# Patient Record
Sex: MALE | Race: WHITE | NOT HISPANIC OR LATINO | Employment: FULL TIME | URBAN - METROPOLITAN AREA
[De-identification: names, ages, dates, MRNs, and addresses within clinical notes are randomized per-mention and may not be internally consistent; named-entity substitution may affect disease eponyms.]

---

## 2017-11-09 ENCOUNTER — HOSPITAL ENCOUNTER (EMERGENCY)
Facility: HOSPITAL | Age: 42
Discharge: HOME | End: 2017-11-09
Attending: EMERGENCY MEDICINE | Admitting: EMERGENCY MEDICINE
Payer: COMMERCIAL

## 2017-11-09 VITALS
OXYGEN SATURATION: 99 % | TEMPERATURE: 98.1 F | RESPIRATION RATE: 20 BRPM | HEIGHT: 70 IN | WEIGHT: 177 LBS | DIASTOLIC BLOOD PRESSURE: 78 MMHG | HEART RATE: 78 BPM | SYSTOLIC BLOOD PRESSURE: 144 MMHG | BODY MASS INDEX: 25.34 KG/M2

## 2017-11-09 DIAGNOSIS — S54.02XA: Primary | ICD-10-CM

## 2017-11-09 PROCEDURE — 99283 EMERGENCY DEPT VISIT LOW MDM: CPT

## 2017-11-09 RX ORDER — DIPHENOXYLATE HYDROCHLORIDE AND ATROPINE SULFATE 2.5; .025 MG/1; MG/1
1 TABLET ORAL DAILY
COMMUNITY

## 2017-11-09 NOTE — DISCHARGE INSTRUCTIONS
Rest, ice, elevate arm  Take motrin 600mg every 6 hours as needed for pain  Call ortho for a follow up appointment  Peripheral Neuropathy   WHAT YOU NEED TO KNOW:   Peripheral neuropathy is a condition that affects how your nerves work  Nerves carry information from your brain to your body  The information does not transfer along your nerves correctly when you have neuropathy  When you have peripheral neuropathy, the nerves in your legs, arms, feet, or hands are affected  It also may affect your organs, such as your lungs, stomach, bladder, or genitals  This condition may go away on its own or you may always have it  DISCHARGE INSTRUCTIONS:   Medicines:   · Pain medicine: You may be given medicine to take away or decrease pain  Do not wait until the pain is severe before you take your medicine  · Antidepressants: This medicine helps to decrease or stop the symptoms of depression  It also used to help decrease pain  Take this medicine as directed  · Antiseizure medicine: This medicine is usually given to control seizures, but it also helps with nerve pain  · Take your medicine as directed  Contact your healthcare provider if you think your medicine is not helping or if you have side effects  Tell him of her if you are allergic to any medicine  Keep a list of the medicines, vitamins, and herbs you take  Include the amounts, and when and why you take them  Bring the list or the pill bottles to follow-up visits  Carry your medicine list with you in case of an emergency  Follow up with your healthcare provider or neurologist as directed:  Write down your questions so you remember to ask them during your visits  Physical therapy:  Physical and occupational therapists may help you exercise your arms, legs, and hands  They may teach you new ways to do things at home  Brace or splint:  You may need a device that supports or holds a body part still   For example, if you have carpal tunnel syndrome, you may need to wear a wrist brace  Manage your peripheral neuropathy:   · Avoid falls: Move with care and stand up slowly  Wear shoes that support your feet, and do not go barefoot  Ask about walking aids, such as a cane or walker  You may want to install railings or nonslip pads in your home, especially in the bathroom  Ask for more information on how to avoid falls  · Check your skin daily:  Sores can form where your skin makes contact with chairs, beds, or other body parts  They also can form under splints  Keep your skin clean, and check your skin daily for sores  · Exercise:  Ask about the best exercise plan for you  Physical activity may increase your balance and strength and may decrease your pain  It is best to start exercising slowly and do more as you get stronger  Contact your healthcare provider or neurologist if:   · Your pain is severe  · You cannot control your bladder  · You have trouble having sex  · You have questions or concerns about your condition or care  Return to the emergency department if:   · You fall  · You cannot walk at all  © 2017 [facility] Information is for End User's use only and may not be sold, redistributed or otherwise used for commercial purposes  All illustrations and images included in CareNotes® are the copyrighted property of [employer] A M [employer] Inc  or [employer]  The above information is an  only  It is not intended as medical advice for individual conditions or treatments  Talk to your doctor, nurse or pharmacist before following any medical regimen to see if it is safe and effective for you

## 2017-11-09 NOTE — ED PROVIDER NOTES
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, has been lifting heavy loads over head x 2 weeks and "hit forearm" one week ago  Denies any pain  Patient presents to the ED with weakness in left hand and tingling sensation  He states last week he hit his left forearm, but does not remember how and this past weekend he was working on his son's quad and had weakness and a tingling sensation in his left 4th and 5th digits  Patient states he straightened out his arm and the weakness improved  He states his fingers were also shaking  He states he was unable to fully extend his left 4th and 5th digits  Patient states the same thing happened last night and today while at work  Patient does work with his arms above his head, he also states he sleeps with his arms under his head and wakes up with his arms tingling  Patient is right handed  He denies any chest pain, SOB, headaches, blurred vision, dizziness or weakness other than 4th and 5th digits  He has an unremarkable PMH, he does not smoke, denies drug abuse and exercises on a regular basis  History provided by:  Patient  Neurologic Problem   Location:  Left 4th and 5th digits  Severity:  Mild  Onset quality:  Gradual  Duration:  1 week  Timing:  Constant  Progression:  Unchanged  Chronicity:  New  Context:  Patient had tingling sensation in left 4th and 5th digits and weakness  Relieved by:  Straightening arm  Worsened by:  Flexing left elbow  Ineffective treatments:  None tried  Associated symptoms: no abdominal pain, no chest pain, no diarrhea, no fever, no headaches, no nausea, no rash, no rhinorrhea, no shortness of breath and no vomiting        Prior to Admission Medications   Prescriptions Last Dose Informant Patient Reported? Taking? multivitamin (THERAGRAN) TABS  Self Yes Yes   Sig: Take 1 tablet by mouth daily      Facility-Administered Medications: None       History reviewed  No pertinent past medical history  History reviewed  No pertinent surgical history  History reviewed  No pertinent family history  I have reviewed and agree with the history as documented  Social History   Substance Use Topics    Smoking status: Former Smoker    Smokeless tobacco: Never Used    Alcohol use Yes      Comment: social        Review of Systems   Constitutional: Negative for chills and fever  HENT: Negative for facial swelling, rhinorrhea and trouble swallowing  Eyes: Negative for visual disturbance  Respiratory: Negative for shortness of breath  Cardiovascular: Negative for chest pain, palpitations and leg swelling  Gastrointestinal: Negative for abdominal pain, diarrhea, nausea and vomiting  Musculoskeletal: Negative for back pain and neck pain  Skin: Negative for rash  Neurological: Positive for tremors and weakness  Negative for dizziness, seizures, syncope, speech difficulty, light-headedness, numbness and headaches  Psychiatric/Behavioral: Negative for confusion and decreased concentration  All other systems reviewed and are negative  Physical Exam  ED Triage Vitals [11/09/17 1045]   Temperature Pulse Respirations Blood Pressure SpO2   98 1 °F (36 7 °C) 78 20 144/78 99 %      Temp Source Heart Rate Source Patient Position - Orthostatic VS BP Location FiO2 (%)   Tympanic Monitor Sitting Right arm --      Pain Score       No Pain           Orthostatic Vital Signs  Vitals:    11/09/17 1045   BP: 144/78   Pulse: 78   Patient Position - Orthostatic VS: Sitting       Physical Exam   Constitutional: He is oriented to person, place, and time  He appears well-developed and well-nourished  He is active and cooperative  He does not appear ill  No distress  HENT:   Head: Normocephalic and atraumatic     Right Ear: Hearing normal    Left Ear: Hearing normal    Nose: Nose normal    Eyes: Conjunctivae are normal  Right pupil is round  Left pupil is round  Neck: Normal range of motion  Muscular tenderness (trapezius) present  No spinous process tenderness present  Cardiovascular: Normal rate, regular rhythm and normal heart sounds  No murmur heard  Pulmonary/Chest: Effort normal and breath sounds normal  He has no wheezes  He has no rhonchi  He has no rales  Musculoskeletal: Normal range of motion  He exhibits no edema, tenderness or deformity  Neurological: He is alert and oriented to person, place, and time  He has normal strength  No cranial nerve deficit or sensory deficit  Gait normal  GCS eye subscore is 4  GCS verbal subscore is 5  GCS motor subscore is 6  Patient does have decreased extension of left wrist compared to right, but strength is intact and 5/5 B/L   strength normal B/L  Skin: Skin is warm and dry  No rash noted  He is not diaphoretic  No pallor  Psychiatric: He has a normal mood and affect  His speech is normal  Cognition and memory are normal    Nursing note and vitals reviewed  ED Medications  Medications - No data to display    Diagnostic Studies  Results Reviewed     None                 No orders to display              Procedures  Procedures       Phone Contacts  ED Phone Contact    ED Course  ED Course                                MDM  Number of Diagnoses or Management Options  Neurapraxia of left ulnar nerve: new and does not require workup  Diagnosis management comments: Patient with left 4th and 5th finger weakness and tingling, most likely nerve compression, will advise to f/u with ortho for EMG  Discussed plan with Dr Kimmy Cadena and she agrees with plan        CritCare Time    Disposition  Final diagnoses:   Neurapraxia of left ulnar nerve     Time reflects when diagnosis was documented in both MDM as applicable and the Disposition within this note     Time User Action Codes Description Comment    11/9/2017 11:26 AM Charli Goyal Add Rogelio Soledad  02XA] Neurapraxia of left ulnar nerve       ED Disposition     ED Disposition Condition Comment    Discharge  Anna Heller discharge to home/self care  Condition at discharge: Stable        Follow-up Information     Follow up With Specialties Details Why Contact Info    [facility]  Call today For recheck and possible EMG [address] [facility]  [phone]        Patient's Medications   Discharge Prescriptions    No medications on file     No discharge procedures on file      ED Provider  Electronically Signed by           Roz Lefort, PA-C  11/09/17 3549

## 2017-11-15 ENCOUNTER — ALLSCRIPTS OFFICE VISIT (OUTPATIENT)
Dept: OTHER | Facility: OTHER | Age: 42
End: 2017-11-15

## 2017-11-16 NOTE — PROGRESS NOTES
Assessment    1  Injury of left ulnar nerve, unspecified injury location, initial encounter (955 2) (S54 02XA)    Plan  Injury of left ulnar nerve, unspecified injury location, initial encounter    · Follow-up PRN Evaluation and Treatment  Follow-up  Status: Complete  Done:[id number] 06:22PM    Discussion/Summary    35 y/o M with resolving ulnar n  entrapment and muscle fatigue  Discussed diagnosis with him  No restrictions  Try to avoid prior heavy overhead activities  f/u as needed if symptoms return  Chief Complaint  L arm injury      History of Present Illness  HPI: 35 y/o M who comes in for eval of his left arm  He had numbness and pain in the arm and hand after doing a very heavy work project where he was doing lots of overhead lifting  No specific injury  Over last few days has had a break and symtoms are better  Numbness has resolved  Had some shaking with fine motor in evenings when working on home equipment  Feels general fatigue  Traps have been sore due to overhead work  Had trouble with wrist DF and RF/SF extension for a few hours  Was seen in ER  RHD  [employer]/[facility]enter[facility] intake form was reviewed  Review of Systems    ROS reviewed  Active Problems  1  Arthritis (716 90) (M19 90)   2  Encounter for sterilization (V25 2) (Z30 2)   3  S/P vasectomy (V26 52) (Z98 52)   4  Vasectomy evaluation (V25 09) (Z30 09)    Past Medical History    The active problems and past medical history were reviewed and updated today  Surgical History   · History of Surgery Vas Deferens Vasectomy    The surgical history was reviewed and updated today  Family History    The family history was reviewed and updated today  Social History   · Denied: History of Drug use   ·    · S/P vasectomy (V26 52) (Z98 52)   · Social alcohol use (Z78 9)   · Denied: History of Tobacco use  The social history was reviewed and updated today        Current Meds    The medication list was reviewed and updated today  Allergies  1  No Known Drug Allergies    Vitals   Recorded: [id number] 07:77WK   Systolic 449   Diastolic 84   Height 5 ft    Weight 198 lb    BMI Calculated 38 67   BSA Calculated 1 86       Physical Exam    Right Elbow: no deformity, erythema, ecchymosis, edema, or tenderness,-- ROM within normal limits in all planes,-- strength within normal limits in all planes-- and-- no evidence of laxity or instability  Left Elbow: Appearance: Normal except  Tenderness: None except the  ROM: Full except as noted: Motor: Normal except as noted: Special Tests: Negative except  Left Wrist: no mass, deformity, erythema, ecchymosis, edema, or tenderness,-- ROM within normal limits in all planes,-- strength within normal limits in all planes-- and-- no evidence of scapholunate instability    Constitutional - General appearance: Normal   Musculoskeletal - Gait and station: Normal -- Upper extremity compartments: Normal   Cardiovascular - Pulses: Normal   Skin - Skin and subcutaneous tissue: Normal   Neurologic - Sensation: Normal -- Upper extremity peripheral neuro exam: Normal   Psychiatric - Orientation to person, place, and time: Normal -- Mood and affect: Normal       Signatures   Electronically signed by : Lara Franco MD; Nov 15 2017  6:23PM EST                       (Author)

## 2018-01-11 NOTE — PROCEDURES
Plan  Encounter for sterilization    · Hydrocodone-Acetaminophen 5-325 MG Oral Tablet; TAKE 1 TO 2 TABLETS EVERY  4 TO 6 HOURS AS NEEDED FOR PAIN   Rx By: Johnna Lima; Dispense: 2 Days ; #:20 Tablet; Refill: 0; For: Encounter for sterilization; ANA LILIA = N; Print Rx   · (1) TISSUE EXAM; Status:Active - Retrospective By Protocol Authorization; Requested  RYK:[id number];    Perform:[facility]; TSW:[id number]; Last Updated Hoang Wills; 1/15/2016 2:00:02 PM;Ordered;  For:Encounter for sterilization; Ordered By:Wayne Davis; Impression? : z30 2  Sources(s) : Vas Deferens, Left   · (1) TISSUE EXAM; Status:Active - Retrospective By Protocol Authorization; Requested  SPE:[id number];    Perform:[facility] In Office [facility]; SQK:[id number]; Last Updated Hoang Wills; 1/15/2016 2:04:14 PM;Ordered;  For:Encounter for sterilization; Ordered By:Wayne Davis; Impression? : z30 2  Sources(s) : Lajoserafa Geetha, Right    Chief Complaint  Chief Complaint Free Text Note Form: Patient returns to the office today for vasectomy sterilization procedure  History of Present Illness  HPI: 58-year-old male presents for vasectomy  He has no new complaints  Review of Systems  Complete-Male Urology:   Genitourinary: No complaints of dysuria, no incontinence, no hesitancy, no nocturia, no genital lesion, no testicular pain  Active Problems    1  Arthritis (716 90) (M19 90)   2  Encounter for sterilization (V25 2) (Z30 2)   3  Vasectomy evaluation (V25 09) (Z30 9)    Surgical History    1  History of Surgery Vas Deferens Vasectomy    Social History    · Denied: History of Drug use   ·    · Social alcohol use (F10 99)   · Denied: History of Tobacco use    Current Meds   1  LORazepam 2 MG Oral Tablet; TAKE 1 TABLET Once 1 hr prior to procedure; Therapy: [id number] to (Evaluate:12Jan2016); Last Rx:11Jan2016 Ordered    Allergies    1   No Known Drug Allergies    Physical Exam    Constitutional General appearance: No acute distress, well appearing and well nourished  Pulmonary   Respiratory effort: No increased work of breathing or signs of respiratory distress  Cardiovascular   Examination of extremities for edema and/or varicosities: Normal     Abdomen   Abdomen: Non-tender, no masses  Liver and spleen: No hepatomegaly or splenomegaly  Genitourinary   Scrotum contents: Normal size, no masses  Epididymis: Normal, no masses  Testes: Normal testes, no masses  Urethral meatus: Normal, no lesions  Penis: Normal, no lesions  Musculoskeletal   Gait and station: Normal     Skin   Skin and subcutaneous tissue: Normal without rashes or lesions  Lymphatic   Palpation of lymph nodes in groin: Normal     Additional Exam:  Neuro exam nonfocal       Procedure    Procedure: Vasectomy   Risks, benefits and alternatives were discussed with the patient  We discussed possible complications, including infection, bleeding and allergic reaction  Written consent was obtained prior to the procedure  The patient was premedicated with lorazepam 2 mg  He was placed on the table in the supine position  He was prepped and draped in a sterile fashion  The scrotum was anesthetized with 15 ml of lidocaine 2%  The sheath was anesthetized with 2 ml of lidocaine 2%  The skin was opened with the sharp clamp and the right vas was grasped with the ring clamp  The perivasal sheath and vessels were then dissected off bluntly and the vas was doubly clamped  A portion was excised and both ends were then fulgurated and tied with 0 silk  Seeing good hemostasis, they were returned to the scrotum and the skin was closed with a chromic stich  The skin was opened with the sharp clamp and the left vas was grasped with the ring clamp  The perivasal sheath and vessels were then dissected off bluntly and the vas was doubly clamped  A portion was excised and both ends were then fulgurated and tied with 0 silk   Seeing good hemostasis, they were returned to the scrotum and the skin was closed with a chromic stich  an antibiotic ointment was applied and a sterile dressing was placed  the patient tolerated the procedure well  there were no complications  He was given a prescription for Keflex and Vicodin postprocedure  He understands he is not to be considered sterile until 2 consecutive negative semen analyses are obtained postprocedure  He also understands he should rest, ice and elevate the scrotum for atleast 48 hours to avoid complication such as hematoma         Signatures   Electronically signed by : SALOMÓN Babcock ; Jan 18 2016  4:34PM EST                       (Author)

## 2018-01-14 VITALS
HEIGHT: 60 IN | SYSTOLIC BLOOD PRESSURE: 128 MMHG | WEIGHT: 198 LBS | DIASTOLIC BLOOD PRESSURE: 84 MMHG | BODY MASS INDEX: 38.87 KG/M2

## 2018-05-16 ENCOUNTER — OFFICE VISIT (OUTPATIENT)
Dept: OBGYN CLINIC | Facility: CLINIC | Age: 43
End: 2018-05-16
Payer: COMMERCIAL

## 2018-05-16 VITALS — BODY MASS INDEX: 27.77 KG/M2 | WEIGHT: 194 LBS | HEIGHT: 70 IN

## 2018-05-16 DIAGNOSIS — M75.42 IMPINGEMENT SYNDROME OF LEFT SHOULDER: ICD-10-CM

## 2018-05-16 DIAGNOSIS — M75.41 IMPINGEMENT SYNDROME OF RIGHT SHOULDER: Primary | ICD-10-CM

## 2018-05-16 PROCEDURE — 20610 DRAIN/INJ JOINT/BURSA W/O US: CPT | Performed by: ORTHOPAEDIC SURGERY

## 2018-05-16 PROCEDURE — 99213 OFFICE O/P EST LOW 20 MIN: CPT | Performed by: ORTHOPAEDIC SURGERY

## 2018-05-16 RX ORDER — BETAMETHASONE SODIUM PHOSPHATE AND BETAMETHASONE ACETATE 3; 3 MG/ML; MG/ML
6 INJECTION, SUSPENSION INTRA-ARTICULAR; INTRALESIONAL; INTRAMUSCULAR; SOFT TISSUE
Status: COMPLETED | OUTPATIENT
Start: 2018-05-16 | End: 2018-05-16

## 2018-05-16 RX ORDER — LIDOCAINE HYDROCHLORIDE 10 MG/ML
5 INJECTION, SOLUTION INFILTRATION; PERINEURAL
Status: COMPLETED | OUTPATIENT
Start: 2018-05-16 | End: 2018-05-16

## 2018-05-16 RX ADMIN — LIDOCAINE HYDROCHLORIDE 5 ML: 10 INJECTION, SOLUTION INFILTRATION; PERINEURAL at 17:29

## 2018-05-16 RX ADMIN — BETAMETHASONE SODIUM PHOSPHATE AND BETAMETHASONE ACETATE 6 MG: 3; 3 INJECTION, SUSPENSION INTRA-ARTICULAR; INTRALESIONAL; INTRAMUSCULAR; SOFT TISSUE at 17:29

## 2018-05-16 NOTE — ASSESSMENT & PLAN NOTE
69-year-old male with right shoulder impingement and some calcific tendinitis  I discussed the diagnosis and radiograph findings with him  I offered him a subacromial injection and recommended strongly the importance of physical therapy  He wished to proceed with both  Please refer to the procedure note  I will see him back as needed

## 2018-05-16 NOTE — ASSESSMENT & PLAN NOTE
16-year-old male with left shoulder impingement, mild  Have recommended that he work on physical therapy with the shoulder as he works on the other one  I will see him back as needed

## 2018-05-16 NOTE — PROGRESS NOTES
Assessment:     1  Impingement syndrome of right shoulder    2  Impingement syndrome of left shoulder          Plan:     Problem List Items Addressed This Visit        Other    Impingement syndrome of right shoulder - Primary     58-year-old male with right shoulder impingement and some calcific tendinitis  I discussed the diagnosis and radiograph findings with him  I offered him a subacromial injection and recommended strongly the importance of physical therapy  He wished to proceed with both  Please refer to the procedure note  I will see him back as needed  Relevant Orders    Ambulatory referral to Physical Therapy    Large joint arthrocentesis    Impingement syndrome of left shoulder     58-year-old male with left shoulder impingement, mild  Have recommended that he work on physical therapy with the shoulder as he works on the other one  I will see him back as needed  Relevant Orders    Ambulatory referral to Physical Therapy           Patient ID: Donell Hackett is a 36 y o  male  Chief Complaint:  Bilateral shoulder pain    HPI:  58-year-old male with bilateral shoulder pain, right much greater than left  This has been bothering him now for close to three months, since starting and February  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  He has been taking ibuprofen  His pain is the worst at night  He had no specific injury  He was put on naproxen and prednisone when he went to urgent care to have this addressed and this gave him some relief for a period of time  He has not done any physical therapy  Talks about a twinging sensation in his shoulder from time to time  He has not done any physical therapy  Patient's medical intake was reviewed  Allergy:  No Known Allergies    Medications:  all current active meds have been reviewed    Past Medical History:  History reviewed   No pertinent past medical history  Past Surgical History:  History reviewed  No pertinent surgical history  Family History:  History reviewed  No pertinent family history  Social History:  History   Alcohol Use    Yes     Comment: social     History   Drug Use No     History   Smoking Status    Former Smoker   Smokeless Tobacco    Never Used           ROS:  Review of Systems   Constitutional: Negative  HENT: Negative  Eyes: Negative  Respiratory: Negative  Cardiovascular: Negative  Gastrointestinal: Negative  Endocrine: Negative  Genitourinary: Negative  Musculoskeletal: Positive for arthralgias and myalgias  Skin: Negative  Allergic/Immunologic: Negative  Neurological: Negative  Hematological: Negative  Psychiatric/Behavioral: Negative  Objective:  BP Readings from Last 1 Encounters:   11/15/17 128/84      Wt Readings from Last 1 Encounters:   05/16/18 88 kg (194 lb)        BMI:   Estimated body mass index is 27 84 kg/m² as calculated from the following:    Height as of this encounter: 5' 10" (1 778 m)  Weight as of this encounter: 88 kg (194 lb)  EXAM:   Physical Exam   Constitutional: He is oriented to person, place, and time  He appears well-developed and well-nourished  No distress  HENT:   Head: Normocephalic and atraumatic  Eyes: Right eye exhibits no discharge  Left eye exhibits no discharge  Neck: Normal range of motion  Neck supple  No tracheal deviation present  Cardiovascular: Normal rate and regular rhythm  Pulmonary/Chest: Effort normal  No respiratory distress  Abdominal: Soft  He exhibits no distension  There is no tenderness  Musculoskeletal:   Normal gait   Neurological: He is alert and oriented to person, place, and time  Skin: Skin is warm  He is not diaphoretic  No erythema  Psychiatric: He has a normal mood and affect   His behavior is normal      Right Shoulder Exam     Tenderness   Right shoulder tenderness location: Mild tenderness and the deltoid posteriorly  Range of Motion   The patient has normal right shoulder ROM  Muscle Strength   The patient has normal right shoulder strength  Tests   Apprehension: negative  Cross Arm: negative  Drop Arm: negative  Hawkin's test: positive  Impingement: positive  Sulcus: absent    Other   Erythema: absent  Sensation: normal  Pulse: present    Comments:  Negative Long's test, negative speed's test, moderate pain with resisted range of motion      Left Shoulder Exam     Tenderness   The patient is experiencing no tenderness  Range of Motion   The patient has normal left shoulder ROM  Muscle Strength   The patient has normal left shoulder strength  Tests   Apprehension: negative  Cross Arm: negative  Drop Arm: negative  Hawkin's test: positive  Impingement: positive  Sulcus: absent    Other   Erythema: absent  Sensation: normal  Pulse: present     Comments:  Negative Long's test, negative speed's test, minimal pain with resisted range of motion              Radiographs:  I have personally reviewed pertinent films in PACS and my interpretation is X-rays of the right shoulder are reviewed which show some calcific tendinitis        Large joint arthrocentesis  Date/Time: 5/16/2018 5:29 PM  Consent given by: patient  Timeout: Immediately prior to procedure a time out was called to verify the correct patient, procedure, equipment, support staff and site/side marked as required   Supporting Documentation  Indications: pain   Procedure Details  Location: shoulder - R subacromial bursa  Preparation: Patient was prepped and draped in the usual sterile fashion  Needle size: 22 G  Ultrasound guidance: no  Approach: posterior  Medications administered: 5 mL lidocaine 1 %; 6 mg betamethasone acetate-betamethasone sodium phosphate 6 (3-3) mg/mL    Patient tolerance: patient tolerated the procedure well with no immediate complications  Dressing:  Sterile dressing applied

## 2018-05-31 ENCOUNTER — EVALUATION (OUTPATIENT)
Dept: PHYSICAL THERAPY | Facility: CLINIC | Age: 43
End: 2018-05-31
Payer: COMMERCIAL

## 2018-05-31 DIAGNOSIS — M75.41 IMPINGEMENT SYNDROME OF RIGHT SHOULDER: ICD-10-CM

## 2018-05-31 DIAGNOSIS — M75.42 IMPINGEMENT SYNDROME OF LEFT SHOULDER: ICD-10-CM

## 2018-05-31 PROCEDURE — G8985 CARRY GOAL STATUS: HCPCS | Performed by: PHYSICAL THERAPIST

## 2018-05-31 PROCEDURE — 97162 PT EVAL MOD COMPLEX 30 MIN: CPT | Performed by: PHYSICAL THERAPIST

## 2018-05-31 PROCEDURE — G8984 CARRY CURRENT STATUS: HCPCS | Performed by: PHYSICAL THERAPIST

## 2018-05-31 NOTE — PROGRESS NOTES
PT Evaluation     Today's date: 2018  Patient name: Duane Frost  : 1975  MRN: [id number]  Referring provider: Sagrario Nails MD  Dx:   Encounter Diagnosis     ICD-10-CM    1  Impingement syndrome of right shoulder M75 41 Ambulatory referral to Physical Therapy   2  Impingement syndrome of left shoulder M75 42 Ambulatory referral to Physical Therapy                  Assessment/Plan     This patient presents w/ B shoulder pain of several months duration which has resolved recently w medications  Currently he exhibits mild weakness in the L shoulder and intermittent pain w/ functional activities  This patient is a good candidate for skilled physical therapy to reduce pain, improve function and reduce likelihood of recurrence  Interventions to include manual therapy, ROM, stretching, strengthening, neuromuscular re-ed and instruction in HEP  Frequency and duration: 1 x/week for 2-4 weeks    STGs: 1 week  1  Initiate HEP    LTGs: 2-4 weeks  1  Independent HEP  2  Increase strength L shoulder to 5/5 throughout  3  Pain-free for usual activities    Subjective   This is a 36 y o  male who reports onset of symptoms a few months ago     Mechanism of injury:  over use, heavier tasks than usual at work, over head work  Current complaint: B shoulder pain (GHJ region) R worse than L, occassional brief shooting pain which he cannot relate to any specific motion or activity  Previous treatment: meds (Naproxen, anti-inflam) - really helped  Dx tests: x-rays  Occupation: yan  Leisure: works out at [employer] goal: would like to learn exercises to build more strength, recover from injury      Objective   Pain scale: R 0-1/10;   L 0/10  Posture/observation: slouched posture, able to correct w/ cueing  Cervical screen: WNL  Palpation: tender/ tight B infraspinatus    ROM: BUE WNL throughout    Strength: BUE 5/5 except L shoulder flexion and ER 4+/5        Precautions: none    Daily Treatment Diary     Manual  5/31                                                                                 Exercise Diary  5/31                         Scap push ups                          TB ER @ side             TB ER @ 90             TB rows             TB LPD             Prone horiz abd             Prone ext             Prone Y             Ball bounce on wall                                                                                                                                      Modalities              CP prn

## 2018-06-06 ENCOUNTER — OFFICE VISIT (OUTPATIENT)
Dept: PHYSICAL THERAPY | Facility: CLINIC | Age: 43
End: 2018-06-06
Payer: COMMERCIAL

## 2018-06-06 DIAGNOSIS — M75.41 IMPINGEMENT SYNDROME OF RIGHT SHOULDER: Primary | ICD-10-CM

## 2018-06-06 DIAGNOSIS — M75.42 IMPINGEMENT SYNDROME OF LEFT SHOULDER: ICD-10-CM

## 2018-06-06 PROCEDURE — 97110 THERAPEUTIC EXERCISES: CPT

## 2018-06-06 PROCEDURE — 97112 NEUROMUSCULAR REEDUCATION: CPT

## 2018-06-06 NOTE — PROGRESS NOTES
Daily Note     Today's date: 2018  Patient name: Anna Heller  : 1975  MRN: [id number]  Referring provider: Karissa Pedroza MD  Dx:   Encounter Diagnosis     ICD-10-CM    1  Impingement syndrome of right shoulder M75 41    2  Impingement syndrome of left shoulder M75 42                   Subjective: Patient noted shoulder weakness L shoulder  more than R shoulder  Patient noted burning in B shoulders with TB ER at side  Objective: See treatment diary below      Assessment: Patient arrived 14 minutes late was accommodated  Patient presented with B shoulder weakness especially  with ER TB at side and L shoulder prone Y exercise  Discussed and updated patient's home exercise program   Patient would benefit from continued PT to strengthen B shoulders and decrease shoulder  pain  Plan: Continue per plan of care     Precautions: none     Daily Treatment Diary      Manual                                                                                                                                                   Exercise Diary                                             Scap push ups    15x                                           TB ER @ side   15xL   10x R                    TB ER @ 90   15xea OTB                   TB rows   5"x20                   TB LPD   15xea                   Prone horiz abd   15xea                   Prone ext   15xea                   Prone Y   15xea                   Ball bounce on wall                                                                                                                                                                                                                                                     Modalities                        CP prn

## 2018-06-13 ENCOUNTER — APPOINTMENT (OUTPATIENT)
Dept: PHYSICAL THERAPY | Facility: CLINIC | Age: 43
End: 2018-06-13
Payer: COMMERCIAL

## 2018-06-18 ENCOUNTER — EVALUATION (OUTPATIENT)
Dept: PHYSICAL THERAPY | Facility: CLINIC | Age: 43
End: 2018-06-18
Payer: COMMERCIAL

## 2018-06-18 DIAGNOSIS — M75.42 IMPINGEMENT SYNDROME OF LEFT SHOULDER: ICD-10-CM

## 2018-06-18 DIAGNOSIS — M75.41 IMPINGEMENT SYNDROME OF RIGHT SHOULDER: ICD-10-CM

## 2018-06-18 PROCEDURE — G8986 CARRY D/C STATUS: HCPCS | Performed by: PHYSICAL THERAPIST

## 2018-06-18 PROCEDURE — 97112 NEUROMUSCULAR REEDUCATION: CPT | Performed by: PHYSICAL THERAPIST

## 2018-06-18 PROCEDURE — G8985 CARRY GOAL STATUS: HCPCS | Performed by: PHYSICAL THERAPIST

## 2018-07-02 ENCOUNTER — APPOINTMENT (OUTPATIENT)
Dept: PHYSICAL THERAPY | Facility: CLINIC | Age: 43
End: 2018-07-02
Payer: COMMERCIAL

## 2018-07-05 ENCOUNTER — APPOINTMENT (OUTPATIENT)
Dept: PHYSICAL THERAPY | Facility: CLINIC | Age: 43
End: 2018-07-05
Payer: COMMERCIAL

## 2018-07-09 ENCOUNTER — APPOINTMENT (OUTPATIENT)
Dept: PHYSICAL THERAPY | Facility: CLINIC | Age: 43
End: 2018-07-09
Payer: COMMERCIAL

## 2018-07-11 ENCOUNTER — APPOINTMENT (OUTPATIENT)
Dept: PHYSICAL THERAPY | Facility: CLINIC | Age: 43
End: 2018-07-11
Payer: COMMERCIAL

## 2018-07-16 ENCOUNTER — APPOINTMENT (OUTPATIENT)
Dept: PHYSICAL THERAPY | Facility: CLINIC | Age: 43
End: 2018-07-16
Payer: COMMERCIAL

## 2018-07-18 ENCOUNTER — APPOINTMENT (OUTPATIENT)
Dept: PHYSICAL THERAPY | Facility: CLINIC | Age: 43
End: 2018-07-18
Payer: COMMERCIAL

## 2019-06-13 ENCOUNTER — TRANSCRIBE ORDERS (OUTPATIENT)
Dept: ADMINISTRATIVE | Facility: HOSPITAL | Age: 44
End: 2019-06-13

## 2019-06-13 DIAGNOSIS — M54.50 LOW BACK PAIN WITHOUT SCIATICA, UNSPECIFIED BACK PAIN LATERALITY, UNSPECIFIED CHRONICITY: Primary | ICD-10-CM

## 2024-02-17 ENCOUNTER — HOSPITAL ENCOUNTER (EMERGENCY)
Dept: HOSPITAL 99 - EMR | Age: 49
Discharge: HOME | End: 2024-02-17
Payer: COMMERCIAL

## 2024-02-17 VITALS — RESPIRATION RATE: 18 BRPM | SYSTOLIC BLOOD PRESSURE: 159 MMHG | DIASTOLIC BLOOD PRESSURE: 88 MMHG

## 2024-02-17 VITALS — BODY MASS INDEX: 29.8 KG/M2

## 2024-02-17 VITALS — RESPIRATION RATE: 20 BRPM | SYSTOLIC BLOOD PRESSURE: 165 MMHG | DIASTOLIC BLOOD PRESSURE: 99 MMHG
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
## 2024-02-17 DIAGNOSIS — S61.451A: Primary | ICD-10-CM

## 2024-02-17 DIAGNOSIS — Z23: ICD-10-CM

## 2024-02-17 PROCEDURE — 12002 RPR S/N/AX/GEN/TRNK2.6-7.5CM: CPT

## 2024-02-17 PROCEDURE — 90471 IMMUNIZATION ADMIN: CPT

## 2024-02-17 PROCEDURE — 99284 EMERGENCY DEPT VISIT MOD MDM: CPT

## 2024-02-17 RX ADMIN — Medication 3 ML: at 13:58

## 2024-02-17 RX ADMIN — CLOSTRIDIUM TETANI TOXOID ANTIGEN (FORMALDEHYDE INACTIVATED), CORYNEBACTERIUM DIPHTHERIAE TOXOID ANTIGEN (FORMALDEHYDE INACTIVATED), BORDETELLA PERTUSSIS TOXOID ANTIGEN (GLUTARALDEHYDE INACTIVATED), BORDETELLA PERTUSSIS FILAMENTOUS HEMAGGLUTININ ANTIGEN (FORMALDEHYDE INACTIVATED), BORDETELLA PERTUSSIS PERTACTIN ANTIGEN, AND BORDETELLA PERTUSSIS FIMBRIAE 2/3 ANTIGEN 0.5 ML: 5; 2; 2.5; 5; 3; 5 INJECTION, SUSPENSION INTRAMUSCULAR at 13:57
